# Patient Record
Sex: MALE | Race: WHITE | NOT HISPANIC OR LATINO | URBAN - METROPOLITAN AREA
[De-identification: names, ages, dates, MRNs, and addresses within clinical notes are randomized per-mention and may not be internally consistent; named-entity substitution may affect disease eponyms.]

---

## 2023-09-22 ENCOUNTER — EMERGENCY (EMERGENCY)
Facility: HOSPITAL | Age: 28
LOS: 1 days | Discharge: HOME | End: 2023-09-22
Attending: EMERGENCY MEDICINE
Payer: SELF-PAY

## 2023-09-22 VITALS
SYSTOLIC BLOOD PRESSURE: 140 MMHG | RESPIRATION RATE: 18 BRPM | WEIGHT: 184.97 LBS | HEART RATE: 78 BPM | OXYGEN SATURATION: 99 % | TEMPERATURE: 99 F | DIASTOLIC BLOOD PRESSURE: 79 MMHG

## 2023-09-22 PROCEDURE — 99283 EMERGENCY DEPT VISIT LOW MDM: CPT | Mod: 25

## 2023-09-22 PROCEDURE — 12011 RPR F/E/E/N/L/M 2.5 CM/<: CPT

## 2023-09-22 PROCEDURE — 99282 EMERGENCY DEPT VISIT SF MDM: CPT | Mod: 25

## 2023-09-22 NOTE — ED ADULT TRIAGE NOTE - CHIEF COMPLAINT QUOTE
Patient reports left eye injury from training today. Laceration noted to left eyelid. No active bleeding noted at this time. Patient denies vision difficulty.

## 2023-09-23 NOTE — ED PROVIDER NOTE - OBJECTIVE STATEMENT
28 year old male denies PMH coming in with laceration above left eye from sparring. denies all other complaints.

## 2023-09-23 NOTE — ED ADULT NURSE NOTE - NSFALLUNIVINTERV_ED_ALL_ED
Bed/Stretcher in lowest position, wheels locked, appropriate side rails in place/Call bell, personal items and telephone in reach/Instruct patient to call for assistance before getting out of bed/chair/stretcher/Non-slip footwear applied when patient is off stretcher/Hext to call system/Physically safe environment - no spills, clutter or unnecessary equipment/Purposeful proactive rounding/Room/bathroom lighting operational, light cord in reach

## 2023-09-23 NOTE — ED ADULT NURSE NOTE - NS ED NURSE RECORD ANOTHER HT AND WT
Chief complaint:   Chief Complaint   Patient presents with   â¢ URI     Pt c/o congestion, sore throat, fatigue. Sx start 4 d ago        Vitals: There were no vitals taken for this visit. HISTORY OF PRESENT ILLNESS     Epic chart reviewed for previous medical records. Case discussed with primary RN responsible for patient. Shabnam Argueta is a 53year old male who presents to the [facility] with complaint of sore throat, body aches, fatigue, productive cough, and nasal congestion times 5 days. Patient describes sore throat as feeling overall, constant, nonradiating, moderate. P.O. tolerant. Has not used any over-the-counter medications for relief. Patient reports his wife had some similar symptoms over a week ago and is now recovered. Had negative COVID test at home. Previously well. Denied  drooling, voice change, trismus, or neck pain. Denied fever, n/v/d/c, abdominal pain, CP, dyspnea, neuro deficits. ALLERGIES    ALLERGIES:  No Known Allergies    CURRENT MEDICATIONS    Current Outpatient Medications:  benzonatate (TESSALON PERLES) 100 MG capsule, Take 1 capsule by mouth 3 times daily as needed for Cough. May increase to 2cap TID if symptoms severe. Do not chew/pop/suck on capsule., Disp: 60 capsule, Rfl: 0  albuterol 108 (90 Base) MCG/ACT inhaler, Inhale 2 puffs into the lungs every 4 hours as needed for Shortness of Breath or Wheezing., Disp: 1 each, Rfl: 0  fluticasone (FLONASE) 50 MCG/ACT nasal spray, Spray 2 sprays in each nostril 2 times daily. , Disp: 1 each, Rfl: 0  zolpidem (AMBIEN) 10 MG tablet, 1 tab orally once a day (at bedtime), Disp: , Rfl:     No current facility-administered medications for this visit. PAST MEDICAL HISTORY    Past Medical History:  No date: Generalized anxiety disorder      Comment:  (pt was on paxil for over 10 years, last taken 6/2013)    SURGICAL HISTORY    History reviewed. No pertinent surgical history.     SOCIAL HISTORY    Social History    Tobacco Use Smoking status: Never Smoker      Smokeless tobacco: Never Used    Vaping Use      Vaping Use: never used    Alcohol use: Yes      Comment: socially    Drug use: Never      FAMILY HISTORY    Review of patient's family history indicates:  Problem: Patient is unaware of any medical problems      Relation: Mother          Age of Onset: (Not Specified)  Problem: Heart disease      Relation: Father          Age of Onset: (Not Specified)  Problem: Patient is unaware of any medical problems      Relation: Sister          Age of Onset: (Not Specified)  Problem: Heart disease      Relation: Paternal Grandfather          Age of Onset: (Not Specified)  Problem: Patient is unaware of any medical problems      Relation: Sister          Age of Onset: (Not Specified)  Problem: Patient is unaware of any medical problems      Relation: Sister          Age of Onset: (Not Specified)  Problem: Patient is unaware of any medical problems      Relation: Son          Age of Onset: (Not Specified)  Problem: Patient is unaware of any medical problems      Relation: Son          Age of Onset: (Not Specified)  Problem: Patient is unaware of any medical problems      Relation: Son          Age of Onset: (Not Specified)        Other significant problems: There are no problems to display for this patient. PAST MEDICAL, FAMILY AND SOCIAL HISTORY     Medications:  Current Outpatient Medications   Medication Sig Dispense Refill   â¢ benzonatate (TESSALON PERLES) 100 MG capsule Take 1 capsule by mouth 3 times daily as needed for Cough. May increase to 2cap TID if symptoms severe. Do not chew/pop/suck on capsule. 60 capsule 0   â¢ albuterol 108 (90 Base) MCG/ACT inhaler Inhale 2 puffs into the lungs every 4 hours as needed for Shortness of Breath or Wheezing. 1 each 0   â¢ fluticasone (FLONASE) 50 MCG/ACT nasal spray Spray 2 sprays in each nostril 2 times daily.  1 each 0   â¢ zolpidem (AMBIEN) 10 MG tablet 1 tab orally once a day (at bedtime) No current facility-administered medications for this visit. Allergies:  ALLERGIES:  No Known Allergies    Past Medical  History/Surgeries:  Past Medical History:   Diagnosis Date   â¢ Generalized anxiety disorder     (pt was on paxil for over 10 years, last taken 6/2013)       No past surgical history on file. Family History:  Family History   Problem Relation Age of Onset   â¢ Patient is unaware of any medical problems Mother    â¢ Heart disease Father    â¢ Patient is unaware of any medical problems Sister    â¢ Heart disease Paternal Grandfather    â¢ Patient is unaware of any medical problems Sister    â¢ Patient is unaware of any medical problems Sister    â¢ Patient is unaware of any medical problems Son    â¢ Patient is unaware of any medical problems Son    â¢ Patient is unaware of any medical problems Son        Social History:  Social History     Tobacco Use   â¢ Smoking status: Never Smoker   â¢ Smokeless tobacco: Never Used   Substance Use Topics   â¢ Alcohol use: Yes     Comment: socially       REVIEW OF SYSTEMS     Review of Systems   Constitutional: Positive for fatigue. Negative for chills and fever. HENT: Positive for congestion and sore throat. Negative for drooling, ear pain, rhinorrhea, sinus pressure, sinus pain and voice change. Eyes: Negative for pain and visual disturbance. Respiratory: Negative for cough and shortness of breath. Cardiovascular: Negative for chest pain and leg swelling. Gastrointestinal: Negative for abdominal pain, constipation, diarrhea, nausea and vomiting. Endocrine: Negative. Genitourinary: Negative for dysuria. Musculoskeletal: Positive for myalgias. Negative for neck pain and neck stiffness. Skin: Negative for rash. Allergic/Immunologic: Negative for immunocompromised state. Neurological: Negative for headaches. All other systems reviewed and are negative. PHYSICAL EXAM     Physical Exam  Vitals and nursing note reviewed. Constitutional:       General: He is not in acute distress. Appearance: Normal appearance. He is not ill-appearing, toxic-appearing or diaphoretic. HENT:      Head: Normocephalic and atraumatic. Right Ear: Tympanic membrane, ear canal and external ear normal. There is no impacted cerumen. Left Ear: Tympanic membrane, ear canal and external ear normal. There is no impacted cerumen. Nose: Congestion present. No rhinorrhea. Right Sinus: No maxillary sinus tenderness or frontal sinus tenderness. Left Sinus: No maxillary sinus tenderness or frontal sinus tenderness. Mouth/Throat:      Lips: Pink. No lesions. Mouth: Mucous membranes are moist. No oral lesions. Dentition: No gum lesions. Tongue: No lesions. Tongue does not deviate from midline. Palate: No mass and lesions. Pharynx: Oropharynx is clear. Uvula midline. Posterior oropharyngeal erythema present. No pharyngeal swelling, oropharyngeal exudate or uvula swelling. Tonsils: No tonsillar exudate or tonsillar abscesses. Neck: Normal range of motion and neck supple. No rigidity or tenderness. Eyes:      General: No scleral icterus. Right eye: No discharge. Left eye: No discharge. Extraocular Movements: Extraocular movements intact. Conjunctiva/sclera: Conjunctivae normal.      Pupils: Pupils are equal, round, and reactive to light. Cardiovascular:      Rate and Rhythm: Normal rate and regular rhythm. Pulses: Normal pulses. Heart sounds: Normal heart sounds. Pulmonary:      Effort: Pulmonary effort is normal. No respiratory distress. Breath sounds: Normal breath sounds. No stridor. No wheezing, rhonchi or rales. Chest:      Chest wall: No tenderness. Abdominal:      General: Abdomen is flat. There is no distension. Palpations: Abdomen is soft. Tenderness: There is no abdominal tenderness. There is no guarding.    Musculoskeletal: General: Normal range of motion. Lymphadenopathy:      Cervical: No cervical adenopathy. Skin:     General: Skin is warm and dry. Capillary Refill: Capillary refill takes less than 2 seconds. Coloration: Skin is not jaundiced. Findings: No rash. Neurological:      General: No focal deficit present. Mental Status: He is alert and oriented to person, place, and time. Psychiatric:         Behavior: Behavior normal.         ASSESSMENT/PLAN       Peewee Souza is a 53year old male with complaint of sore throat, myalgia, and cough x 5 days. Well appearing. NAD. Afebrile. Visit Vitals  /79 (BP Location: LUE - Left upper extremity, Patient Position: Sitting, Cuff Size: Regular)   Pulse 84   Temp 97.1 Â°F (36.2 Â°C) (Tympanic)   Resp 18   Wt 93.4 kg (206 lb)   SpO2 99%   BMI 27.94 kg/mÂ²         Labs   Results for orders placed or performed in visit on 07/02/22   GROUP A STREP THROAT PCR    Specimen: Throat; Swab   Result Value    STREPTOCOCCUS GROUP A PCR Not Detected     Comment: This result was obtained by RT-PCR amplification followed by fluorescence detection. This test has been cleared by the [place]. Food and Drug Administration for detection of Strep A. Jane Good was seen today for uri. Diagnoses and all orders for this visit:    Sore throat  -     GROUP A STREP THROAT PCR  -     SARS-COV-2/INFLUENZA BY PCR        1. Cough and congestion  Consistent with URI. COVID and flu negative Not consistent with PNA, pertussis or other emergent pathology. 2. Sore throat  Consistent with viral infection. Strep negative. Not consistent with differential to include but not limited to epiglottitis, Ludwigs, RPA, PTA. Low risk mono (no adenopathy, no increased fatigue, no abdominal pain). Pt declined mono testing after shared decision making. Patient will consider getting mono testing if not improved in a few more days.       Plan  After evaluation, feel patient is safe for outpatient management. Will discharge with symptomatic treatment. PCM follow up. Discussed return precautions. Patient appeared to understand and agree with plan. Final recheck on pt at discharge was reassuring and patient was appropriately stable at time of discharge from urgent care clinic. All questions answered and patient appeared to understand and agree with treatment and discharge plan, including return precautions and follow up plan. The provisional diagnosis that the patient is discharged with today was based on the history taken, presenting symptoms, physical exam, and/or any ancillary testing. Patient states understanding that often times the diagnosis can change. If new or worsening symptoms occur, patient was instructed to seek immediate medical attention for re-evaluation. See the After Visit Summary for additional instructions, follow-up plans and/or emergency room precautions discussed with the patient.     The following PPE was worn by provider during all interactions and exam with this patient:  [] Faceshield + level 2 or 3 procedural mask  [ ] Gloves  [x ] Gloves, gown, faceshield, N95 mask Yes

## 2023-09-23 NOTE — ED PROVIDER NOTE - NSFOLLOWUPINSTRUCTIONS_ED_ALL_ED_FT
Tissue Adhesive Wound Care  Some cuts, wounds, lacerations, and incisions can be repaired by using tissue adhesive, also called skin glue. It holds the skin together so healing can happen faster. It forms a strong bond on the skin in about 1 minute, and it reaches its full strength in about 2–3 minutes. The adhesive goes away on its own while the wound is healing. It is important to take good care of your wound while it heals.    Follow these instructions at home:  Wound care    Washing hands with soap and water.  Two wounds closed with skin glue. One is normal. The other is red with pus and infected.  If a bandage (dressing) has been applied, keep it clean and dry.  Follow instructions from your health care provider about how often to change the dressing. Make sure you:  Wash your hands with soap and water for at least 20 seconds before and after you change your dressing. If soap and water are not available, use hand .  Change your dressing as told by your health care provider.  Leave tissue adhesive in place. It will come off on its own after 7–10 days.  Do not scratch, rub, or pick at the adhesive.  Do not place tape over the adhesive. The adhesive could come off the wound when you pull the tape off.  Check the wound daily to make sure it is not starting to reopen.  Protect the wound from further injury until it is healed.  Check your wound area every day for signs of infection. Check for:  More redness, swelling, or pain.  Fluid or blood.  Warmth or a rash around the wound.  Pus or a bad smell.  Hardness or a lump that is not from the adhesive.  Bathing    Do not take baths, swim, or use a hot tub until your health care provider approves. Ask your health care provider if you may take showers. You may only be allowed to take sponge baths.  You can usually shower after the first 24 hours.  Cover the dressing with a watertight covering when you take a shower.  Do not soak the area where there is tissue adhesive.  Do not use any soaps, petroleum jelly products, or ointments on the wound. Certain ointments can weaken the adhesive.  Eating and drinking    Eat healthy foods to help the wound heal. As told by your health care provider, eat foods rich in:  Protein. These include meat, fish, eggs, dairy, beans, and nuts.  Vitamin A. These include carrots and dark green, leafy vegetables.  Vitamin C. These include citrus fruits, tomatoes, broccoli, and peppers.  Drink enough fluid to keep your urine pale yellow.  General instructions    Protect your wound from the sun when you are outside for the first 6 months, or for as long as told by your health care provider. Apply sunscreen with an SPF of 30 or higher around the scar, or cover it up.  Take over-the-counter and prescription medicines only as told by your health care provider.  Do not use any products that contain nicotine or tobacco. These products include cigarettes, chewing tobacco, and vaping devices, such as e-cigarettes. These can delay wound healing. If you need help quitting, ask your health care provider.  Keep all follow-up visits. This is important.  Contact a health care provider if:  The tissue adhesive becomes soaked with blood or falls off before your wound has healed. The adhesive may need to be replaced.  You have a fever or chills.  You have redness, swelling, or pain around the wound.  You have fluid or blood coming from the wound.  You develop a rash after the adhesive is applied.  You have hardness around the wound site.  Get help right away if:  Your wound reopens.  You have a red streak at the area around the wound.  You have pus or a bad smell coming from the wound.  Summary  The adhesive goes away on its own while the wound is healing. It is important to take good care of your wound at home while it heals.  Always wash your hands with soap and water for at least 20 seconds before and after changing your bandage (dressing).  To help with healing, eat foods that are rich in protein, vitamin A, and vitamin C.  Check your wound area every day for signs of infection.  This information is not intended to replace advice given to you by your health care provider. Make sure you discuss any questions you have with your health care provider.    Document Revised: 04/25/2022 Document Reviewed: 04/25/2022
